# Patient Record
(demographics unavailable — no encounter records)

---

## 2024-12-19 NOTE — DISCUSSION/SUMMARY
[___ Month(s)] : in [unfilled] month(s) [FreeTextEntry1] : 1. Chronic Systolic/Diastolic Congestive HF: Minimally improved LVEF. Weight significantly increased from post discharge weight, emphasized importance of daily daily Torsemide and would try to continue to optimize his GDMT? On Entresto 24-26 mg BID, Toprol XL 50 mg BID, Farxiga 10 mg QD and Spironolactone 25 mg QD. Emphasized importance of home BP monitoring to assess for hypotension, needs to followup with HF as soon as possible - task sent to them. 2. Device: has CRT-D. Benefit of upgrade to CardioMEMS? I reviewed Epic notes from [facility] and [facility], no one actively monitoring his device. Consider referral to EPS for long term monitoring. 3. Last lab 9/25/24. labs requested 4. Lengthy discussion regarding cardiac transplant evaluation, being transitioned to transplant team. Explained role of LVAD as bridge therapy. records from [facility] reviewed. No RHC ever performed, last LHC in 2020. 5. AF -continue DOAC and rate control with bbl. 6. DMT2 - told 9again) needs to find Primary care physician, but requested one in [place]. I sent out request for nurse liaison team to arrange for followup.  [EKG obtained to assist in diagnosis and management of assessed problem(s)] : EKG obtained to assist in diagnosis and management of assessed problem(s)

## 2024-12-19 NOTE — HISTORY OF PRESENT ILLNESS
[FreeTextEntry1] : 64 year old man with history of longstanding NICM, stage D (LVIDd 7.3 cm, LVEF <20%, Dx 2005), NYHA Functional Class 2 s/p CRT-D (non MRI conditional), HTN, HLD, h/o AF (on Xarelto), R MCA CVA (without residual deficits) and DMT2, formerly followed by Dr. Marte.   Diagnosed at [facility] with NICMP in 2005, thought to be secondary to viral infection; at that time LVEF was reportedly 30-35%. There was progressive decline in 2023 and most recent TTEs show LVEF around 15-20%.   [id number] for ADHF 4 times in the past year, once requiring inotropic therapy. He was followed by Dr. Paula at [place] who recently referred him to [facility] for ADHF in May 2024 for consideration of LVAD/TXP evaluation and as per patient, he has been deferred from transplant due to lack of adequate social support (family lives out of state but did bring his  to [place] visit). He states that he is not interested in an LVAD.  Recently hospitalized at [facility] 6/9-6/14/24, presented with a transient episode of weakness and blurry vision. Thought to be euvolemic and compensated but notably history of high risk features including severity of LVSD, recurrent hospitalizations, prior need for inotropic support, seemingly an inability to tolerate GDMT and frequent ventricular ectopy noted on ICD interrogation. A CT head identified old infarct. A formal evaluation was launched for both LVAD/TXP, ABO A with intention to complete evaluation once outpatient. He was discharged at a weight of 218 lbs on 1/2 tablet of Entresto 24-26 mg BID, Toprol XL 50 mg QD and Spironolactone 25 mg QD.   He has tried to d/c his Torsemide but notes that he feels "abdominal fullness" whenever he stops taking diuretic. Denies any significant ongoing episodes of palpations, denies any Id discharge. Diagnosed with latent TB, being treated. Missed several appointments with IM, ophthalmology - claims his stress testing was rescheduled for later this month, but not on the schedule that I can see.  States that he is checking daily weights and is aware of weight gain which he attributes to good appetite and increased caloric consumption. He is adhering to a low sodium diet. He is taking all medications, resumed prior prescribed Jardiance. He had questions regarding why he was on Eplerenone; told him to use Aldactone instead.   He admits to significant weight gain recently, some b/l LE edema. Not taking meds consistently leidy diuretics?  Single, works as a  at a car dealership.

## 2024-12-19 NOTE — CARDIOLOGY SUMMARY
[de-identified] : 9/3/24, Sinus Rhythm -First degree A-V block -occasional ectopic ventricular beat   -Right bundle branch block with left axis -bifascicular block.

## 2024-12-19 NOTE — CARDIOLOGY SUMMARY
[de-identified] : 9/3/24, Sinus Rhythm -First degree A-V block -occasional ectopic ventricular beat   -Right bundle branch block with left axis -bifascicular block.

## 2024-12-19 NOTE — HISTORY OF PRESENT ILLNESS
[FreeTextEntry1] : 64 year old man with history of longstanding NICM, stage D (LVIDd 7.3 cm, LVEF <20%, Dx 2005), NYHA Functional Class 2 s/p CRT-D (non MRI conditional), HTN, HLD, h/o AF (on Xarelto), R MCA CVA (without residual deficits) and DMT2, formerly followed by Dr. Marte.   Diagnosed at [facility] with NICMP in 2005, thought to be secondary to viral infection; at that time LVEF was reportedly 30-35%. There was progressive decline in 2023 and most recent TTEs show LVEF around 15-20%.   [id number] for ADHF 4 times in the past year, once requiring inotropic therapy. He was followed by Dr. Paula at [place] who recently referred him to [facility] for ADHF in May 2024 for consideration of LVAD/TXP evaluation and as per patient, he has been deferred from transplant due to lack of adequate social support (family lives out of state but did bring his  to [place] visit). He states that he is not interested in an LVAD.  Recently hospitalized at [facility] 6/9-6/14/24, presented with a transient episode of weakness and blurry vision. Thought to be euvolemic and compensated but notably history of high risk features including severity of LVSD, recurrent hospitalizations, prior need for inotropic support, seemingly an inability to tolerate GDMT and frequent ventricular ectopy noted on ICD interrogation. A CT head identified old infarct. A formal evaluation was launched for both LVAD/TXP, ABO A with intention to complete evaluation once outpatient. He was discharged at a weight of 218 lbs on 1/2 tablet of Entresto 24-26 mg BID, Toprol XL 50 mg QD and Spironolactone 25 mg QD.   He has tried to d/c his Torsemide but notes that he feels "abdominal fullness" whenever he stops taking diuretic. Denies any significant ongoing episodes of palpations, denies any Id discharge. Diagnosed with latent TB, being treated. Missed several appointments with IM, ophthalmology - claims his stress testing was rescheduled for later this month, but not on the schedule that I can see.  States that he is checking daily weights and is aware of weight gain which he attributes to good appetite and increased caloric consumption. He is adhering to a low sodium diet. He is taking all medications, resumed prior prescribed Jardiance. He had questions regarding why he was on Eplerenone; told him to use Aldactone instead.   He admits to significant weight gain recently, some b/l LE edema. Not taking meds consistently leidy diuretics?  Single, works as a  at a car dealership.

## 2024-12-19 NOTE — DISCUSSION/SUMMARY
[___ Month(s)] : in [unfilled] month(s) [FreeTextEntry1] : 1. Chronic Systolic/Diastolic Congestive HF: Minimally improved LVEF. Weight significantly increased from post discharge weight, emphasized importance of daily daily Torsemide and would try to continue to optimize his GDMT? On Entresto 24-26 mg BID, Toprol XL 50 mg BID, Farxiga 10 mg QD and Spironolactone 25 mg QD. Emphasized importance of home BP monitoring to assess for hypotension, needs to followup with HF as soon as possible - task sent to them. 2. Device: has CRT-D. Benefit of upgrade to CardioMEMS? I reviewed Epic notes from [facility] and [facility], no one actively monitoring his device. Consider referral to EPS for long term monitoring. 3. Last lab 9/25/24. labs requested 4. Lengthy discussion regarding cardiac transplant evaluation, being transitioned to transplant team. Explained role of LVAD as bridge therapy. records from [facility] reviewed. No RHC ever performed, last LHC in 2020. 5. AF -continue DOAC and rate control with bbl. 6. DMT2 - told 9again) needs to find Primary care physician, but requested one in [place]. I sent out request for nurse liaison team to arrange for followup.  [EKG obtained to assist in diagnosis and management of assessed problem(s)] : EKG obtained to assist in diagnosis and management of assessed problem(s)